# Patient Record
(demographics unavailable — no encounter records)

---

## 2025-03-11 NOTE — PHYSICAL EXAM
[No Acute Distress] : no acute distress [Well Nourished] : well nourished [Well Developed] : well developed [Normal Sclera/Conjunctiva] : normal sclera/conjunctiva [PERRL] : pupils equal round and reactive to light [Normal Outer Ear/Nose] : the outer ears and nose were normal in appearance [No Respiratory Distress] : no respiratory distress  [Coordination Grossly Intact] : coordination grossly intact [Deep Tendon Reflexes (DTR)] : deep tendon reflexes were 2+ and symmetric [Normal Affect] : the affect was normal [Normal Insight/Judgement] : insight and judgment were intact

## 2025-03-11 NOTE — HISTORY OF PRESENT ILLNESS
[FreeTextEntry1] : Patient at office today for follow up on blood pressure  [de-identified] : Last seen in 9/24  reports SBP in the 140's at home  denies any chest pain palpitations or shortness of breath

## 2025-07-01 NOTE — HISTORY OF PRESENT ILLNESS
[FreeTextEntry8] : C/o as noted above  c/o difficulty losing weight, fatigue and weakness for elevated blood pressure, x 2 months  has not been taking his BP medicine for the past few months

## 2025-07-01 NOTE — ASSESSMENT
[FreeTextEntry1] : declined lab work at todays visit  Advised get AM lab draw on Monday and schedule annual physical  get re-evaluated sooner if concerns worsen or readings remain elevated  Patient agreeable to plan all questions answered